# Patient Record
Sex: MALE | Race: BLACK OR AFRICAN AMERICAN | NOT HISPANIC OR LATINO | Employment: OTHER | URBAN - METROPOLITAN AREA
[De-identification: names, ages, dates, MRNs, and addresses within clinical notes are randomized per-mention and may not be internally consistent; named-entity substitution may affect disease eponyms.]

---

## 2020-07-06 ENCOUNTER — TELEPHONE (OUTPATIENT)
Dept: TRANSPLANT | Facility: CLINIC | Age: 55
End: 2020-07-06

## 2020-07-06 NOTE — TELEPHONE ENCOUNTER
----- Message from Anuel Hermosillo sent at 7/6/2020  5:39 PM CDT -----    Hepatology referral received and scanned into media; pt chart sent to referral nurse for medical review.     Referring Provider: Kira Martines MD  Phone: [phone]  Fax: [phone]

## 2020-07-09 ENCOUNTER — DOCUMENTATION ONLY (OUTPATIENT)
Dept: TRANSPLANT | Facility: CLINIC | Age: 55
End: 2020-07-09

## 2020-07-09 ENCOUNTER — TELEPHONE (OUTPATIENT)
Dept: HEPATOLOGY | Facility: CLINIC | Age: 55
End: 2020-07-09

## 2020-07-09 NOTE — LETTER
July 9, 2020    Jose Carlos Barreto  [address] [facility] [address]          Dear Jose Carlos Barreto:    Your doctor has referred you to the [facility] for liver transplant consideration.  Your demographic and insurance information have been forwarded to our financial counselor for insurance clearance.  You will be contacted by our office once your insurance company has approved a transplant consult and/or evaluation for you. An initial appointment will then be scheduled for you.     We look forward to seeing you soon. If you have any further questions, please contact us at [phone].       Sincerely,        [facility]   [address] [place] [address] [id number] [phone]

## 2020-07-09 NOTE — PROGRESS NOTES
Pt records reviewed.  Pt will be referred to Hepatology due to Hepatomegaly   Initial referral received  from Dr. Kira Martines  Referral letter sent to provider and patient.      RECORDS SCANNED IN MEDIA UNDER HEPATOLOGY REFERRAL .

## 2020-08-10 ENCOUNTER — TELEPHONE (OUTPATIENT)
Dept: HEPATOLOGY | Facility: CLINIC | Age: 55
End: 2020-08-10

## 2020-08-10 NOTE — TELEPHONE ENCOUNTER
----- Message from Jaylyn Lowe sent at 8/10/2020  9:50 AM CDT -----  Pt calling to cancel appt 8.13 stated his transportation wont be ready until after wants to reschedule      Pt contact [phone]

## 2020-09-08 ENCOUNTER — OFFICE VISIT (OUTPATIENT)
Dept: HEPATOLOGY | Facility: CLINIC | Age: 55
End: 2020-09-08
Payer: MEDICAID

## 2020-09-08 ENCOUNTER — PROCEDURE VISIT (OUTPATIENT)
Dept: HEPATOLOGY | Facility: CLINIC | Age: 55
End: 2020-09-08
Payer: MEDICAID

## 2020-09-08 VITALS
HEIGHT: 68 IN | SYSTOLIC BLOOD PRESSURE: 151 MMHG | RESPIRATION RATE: 18 BRPM | DIASTOLIC BLOOD PRESSURE: 77 MMHG | BODY MASS INDEX: 33.78 KG/M2 | TEMPERATURE: 97 F | WEIGHT: 222.88 LBS | HEART RATE: 69 BPM | OXYGEN SATURATION: 93 %

## 2020-09-08 DIAGNOSIS — R16.0 HEPATOMEGALY: Primary | ICD-10-CM

## 2020-09-08 DIAGNOSIS — K76.0 FATTY LIVER: ICD-10-CM

## 2020-09-08 PROCEDURE — 99204 OFFICE O/P NEW MOD 45 MIN: CPT | Mod: S$PBB,,, | Performed by: NURSE PRACTITIONER

## 2020-09-08 PROCEDURE — 91200 LIVER ELASTOGRAPHY: CPT | Mod: 26,S$PBB,, | Performed by: NURSE PRACTITIONER

## 2020-09-08 PROCEDURE — 99999 PR PBB SHADOW E&M-EST. PATIENT-LVL V: ICD-10-PCS | Mod: PBBFAC,,, | Performed by: NURSE PRACTITIONER

## 2020-09-08 PROCEDURE — 99999 PR PBB SHADOW E&M-EST. PATIENT-LVL V: CPT | Mod: PBBFAC,,, | Performed by: NURSE PRACTITIONER

## 2020-09-08 PROCEDURE — 99204 PR OFFICE/OUTPT VISIT, NEW, LEVL IV, 45-59 MIN: ICD-10-PCS | Mod: S$PBB,,, | Performed by: NURSE PRACTITIONER

## 2020-09-08 PROCEDURE — 91200 LIVER ELASTOGRAPHY: CPT | Mod: PBBFAC | Performed by: NURSE PRACTITIONER

## 2020-09-08 PROCEDURE — 99215 OFFICE O/P EST HI 40 MIN: CPT | Mod: PBBFAC,25 | Performed by: NURSE PRACTITIONER

## 2020-09-08 PROCEDURE — 91200 FIBROSCAN (VIBRATION CONTROLLED TRANSIENT ELASTOGRAPHY): ICD-10-PCS | Mod: 26,S$PBB,, | Performed by: NURSE PRACTITIONER

## 2020-09-08 RX ORDER — HYDROCODONE BITARTRATE AND ACETAMINOPHEN 5; 325 MG/1; MG/1
1 TABLET ORAL
COMMUNITY

## 2020-09-08 RX ORDER — HYDROCHLOROTHIAZIDE 12.5 MG/1
12.5 CAPSULE ORAL
COMMUNITY

## 2020-09-08 RX ORDER — DULOXETIN HYDROCHLORIDE 30 MG/1
30 CAPSULE, DELAYED RELEASE ORAL
COMMUNITY

## 2020-09-08 RX ORDER — AMLODIPINE BESYLATE 10 MG/1
10 TABLET ORAL
COMMUNITY

## 2020-09-08 RX ORDER — METOPROLOL SUCCINATE 100 MG/1
200 TABLET, EXTENDED RELEASE ORAL
COMMUNITY

## 2020-09-08 RX ORDER — ALLOPURINOL 300 MG/1
300 TABLET ORAL
COMMUNITY

## 2020-09-08 NOTE — PROCEDURES
FibroScan (Vibration Controlled Transient Elastography)    Date/Time: 9/8/2020 2:15 PM  Performed by: Rossy Post NP  Authorized by: Rossy Post NP     Diagnosis:  NAFLD    Probe:     Universal Protocol: Patient's identity, procedure and site were verified, confirmatory pause was performed. Discussed procedure including risks and potential complications.  Questions answered.  Patient verbalizes understanding and wishes to proceed with VCTE.     Procedure: After providing explanations of the procedure, patient was placed in the supine position with right arm in maximum abduction to allow optimal exposure of right lateral abdomen.  Patient was briefly assessed, Testing was performed in the mid-axillary location, 50Hz Shear Wave pulses were applied and the resulting Shear Wave and Propagation Speed detected with a 3.5 MHz ultrasonic signal, using the FibroScan probe, Skin to liver capsule distance and liver parenchyma were accessed during the entire examination with the FibroScan probe, Patient was instructed to breathe normally and to abstain from sudden movements during the procedure, allowing for random measurements of liver stiffness. At least 10 Shear Waves were produced, Individual measurements of each Shear Wave were calculated.  Patient tolerated the procedure well with no complications.  Meets discharge criteria as was dismissed.  Rates pain 0 out of 10.  Patient will follow up with ordering provider to review results.      Findings  Median liver stiffness score:  5.6  CAP Reading: dB/m:  261    IQR/med %:  7  Interpretation  Fibrosis interpretation is based on medial liver stiffness - Kilopascal (kPa).    Fibrosis Stage:  F 0-1  Steatosis interpretation is based on controlled attenuation parameter - (dB/m).    Steatosis Grade:  S2

## 2020-09-08 NOTE — PATIENT INSTRUCTIONS
1. Labs today to monitor liver function.  2. Fibroscan shows minimal to no fibrosis and moderate hepatic steatosis (fat in the liver).  3. Recommend weight loss of 20-25 pounds.  4. Recommend aerobic exercise 30 minutes daily.  5. Recommend low carb, low calorie, high fiber diet.  6. Limit alcohol intake.   7. Referral placed to nutrition services. Email them at [email or web address] or call [phone] to see if they can do a virtual visit.  8. Return to clinic in 1 year with repeat Fibroscan.

## 2020-09-08 NOTE — PROGRESS NOTES
[facility] VISIT NEW PT NOTE    REFERRING PROVIDER:  Dr. Kira Martines    CHIEF COMPLAINT: Hepatomegaly    HPI: Mr. Barreto is a 55 y.o.  male with PMH noted below, presenting for evaluation of hepatomegaly seen on recent outside ultrasound. LFT's were normal, when last checked in June. Risk factors for the development of fatty liver disease include HTN, DMII, and obesity (BMI 33). HgbA1c in June was 5.8%. He denies any known family history of liver disease. He drinks socially 2-3 times per week, consuming on average 3-4 beers each time. Today in clinic he is well appearing and denies jaundice, dark urine, abdominal distention, hematemesis, melena, slowed mentation. No abnormal skin rashes or itching.     Review of patient's allergies indicates:  No Known Allergies    Current Outpatient Medications on File Prior to Visit   Medication Sig Dispense Refill    allopurinoL (ZYLOPRIM) 300 MG tablet Take 300 mg by mouth.      amLODIPine (NORVASC) 10 MG tablet Take 10 mg by mouth.      DULoxetine (CYMBALTA) 30 MG capsule Take 30 mg by mouth.      hydroCHLOROthiazide (MICROZIDE) 12.5 mg capsule Take 12.5 mg by mouth.      HYDROcodone-acetaminophen (NORCO) 5-325 mg per tablet Take 1 tablet by mouth.      metoprolol succinate (TOPROL-XL) 100 MG 24 hr tablet Take 200 mg by mouth.       No current facility-administered medications on file prior to visit.      PMHX:  has a past medical history of Chronic back pain, Diabetes, Hepatomegaly, and Hypertension.    PSHX:  has a past surgical history that includes Back surgery.    FAMILY HISTORY: Negative for liver disease, reviewed in [facility]    SOCIAL HISTORY:   Social History     Tobacco Use   Smoking Status Never Smoker     Social History     Substance and Sexual Activity   Alcohol Use Yes    Alcohol/week: 3.0 standard drinks    Types: 3 Cans of beer per week    Frequency: 2-3 times a week    Drinks per session: 3 or 4    Binge frequency: Less  "than monthly     Social History     Substance and Sexual Activity   Drug Use Not on file     ROS:   GENERAL: Denies fever, chills, weight loss/gain, fatigue  HEENT: Denies headaches, dizziness, vision/hearing changes  CARDIOVASCULAR: Denies chest pain, palpitations, or edema  RESPIRATORY: Denies dyspnea, cough  GI: Denies abdominal pain, rectal bleeding, nausea, vomiting. No change in bowel pattern or color  : Denies dysuria, hematuria   SKIN: Denies rash, itching   NEURO: Denies confusion, memory loss, or mood changes  PSYCH: Denies depression or anxiety  HEME/LYMPH: Denies easy bruising or bleeding    PHYSICAL EXAM:   Friendly Black or  male, in no acute distress; alert and oriented to person, place and time  VITALS: BP (!) 151/77 (BP Location: Right arm, Patient Position: Sitting, BP Method: Large (Automatic))   Pulse 69   Temp 97 °F (36.1 °C) (Oral)   Resp 18   Ht 5' 8" (1.727 m)   Wt 101.1 kg (222 lb 14.2 oz)   SpO2 (!) 93%   BMI 33.89 kg/m²   HENT: Normocephalic, without obvious abnormality. Oral mucosa pink and moist. Dentition good.  EYES: Sclerae anicteric. No conjunctival pallor.   NECK: Supple. No masses or cervical adenopathy.  CARDIOVASCULAR: Regular rate and rhythm. No murmurs.  RESPIRATORY: Normal respiratory effort. BBS CTA. No wheezes or crackles.  GI: Soft, non-tender, non-distended. No hepatosplenomegaly. No masses palpable. No ascites.  EXTREMITIES:  No clubbing, cyanosis or edema.  SKIN: Warm and dry. No jaundice. No rashes noted to exposed skin. No telangectasias noted. No palmar erythema.  NEURO:  Normal gait. No asterixis.  PSYCH:  Memory intact. Thought and speech pattern appropriate. Behavior normal. No depression or anxiety noted.    DIAGNOSTIC STUDIES:    OUTSIDE LABS:                FIBROSCAN - Ordered at visit.    Findings  Median liver stiffness score:  5.6  CAP Reading: dB/m:  261     IQR/med %:  7  Interpretation  Fibrosis interpretation is based on medial " liver stiffness - Kilopascal (kPa).     Fibrosis Stage:  F 0-1  Steatosis interpretation is based on controlled attenuation parameter - (dB/m).     Steatosis Grade:  S2     ASSESSMENT & EDUCATION:  55 y.o.  male with hepatomegaly secondary to fatty liver disease. We discussed the manifestations of non-alcoholic fatty liver disease. At this time, there are no FDA approved therapy for non-alcoholic fatty liver disease. The patient and I discussed the importance of diet, exercise, and weight loss for management of NAFLD. We discussed a low fat, low carb/sugar, high fiber diet and a goal of 30 minutes of exercise 5 times per week. Referral placed to [facility] nutritional services department. We also discussed that fatty liver can progress to steatohepatitis and possibly to cirrhosis. Reassuringly, his LFT's were normal in June and Fibroscan today in clinic to stage his liver disease was suggestive of moderate hepatic steatosis, with minimal to no fibrosis, and a low likelihood of cirrhosis.    PLAN:    1. Labs today to monitor liver function.  2. Fibroscan to stage liver disease.  3. Recommend weight loss of 20-25 pounds.  4. Recommend aerobic exercise 30 minutes daily.  5. Recommend low carb, low calorie, high fiber diet.  6. Limit alcohol intake.    Follow up in about 1 year (around 9/8/2021). with Fibroscan before visit.    Thank you for allowing me to participate in the care of Jose Carlos Barreto       Hepatology Nurse Practitioner  [facility]  9/8/2020 @ 1445    CC'ed note to:   Kira Martines MD Anita Deborah, MD

## 2020-09-08 NOTE — LETTER
September 8, 2020      Kira Martines MD  [address] [facility] [address]           [facility] - Transplant 1st Fl  [address]  [facility] [address]  Phone: [phone]  Fax: [phone]          Patient: Jose Carlos Barreto   MR Number: [id number]   YOB: 1965   Date of Visit: 9/8/2020       Dear Dr. Kira Martines:    Thank you for referring Jose Carlos Barreto to me for evaluation. Attached you will find relevant portions of my assessment and plan of care.    If you have questions, please do not hesitate to call me. I look forward to following Jose Carlos Barreto along with you.    Sincerely,    Rossy Post, ADILIA    Enclosure  CC:  No Recipients    If you would like to receive this communication electronically, please contact [email or web address] or [phone] to request more information on [employer] access.    For providers and/or their staff who would like to refer a patient to [facility], please contact us through our one-stop-shop provider referral line, [facility], at [phone].    If you feel you have received this communication in error or would no longer like to receive these types of communications, please e-mail [email or web address]